# Patient Record
(demographics unavailable — no encounter records)

---

## 2025-07-17 NOTE — HISTORY OF PRESENT ILLNESS
[FreeTextEntry1] : Kianna Delarosa is a 39-year-old woman with a fam hx of HLD and CAD, she has a hx of spontaneous pneumothorax at age 22 requiring sx, depression/anxiety (follows with a therapist), HLD, GERD here for an initial CV evaluation. She saw a cardiologist, Sybil Bauer M.D., last year for palpitations post Bactrim allergy and she reports normal work-up (stress testing, Ca scoring). She had a recent stress and echo at [facility], and she was told she needed further testing.ET: able to walk numerous [place] blocks without symptoms, attends [employer] 2X/week, and RUIZ up to 3-4 flights. She remarks on eating fairly healthy but eats candy/sweets/ high carb food. She has ~4 ETOH beverages per week (wine/martini/vodka cocktails). Denies CP, SOB/RUIZ, PND/orthopnea, LILA, dizziness, palpitations, syncope or near syncope. She is here for an initial CV evaluation and would like to transfer care to [facility].   of note: Her father is a radiologist for [employer], Dr. Delarosa

## 2025-07-17 NOTE — ASSESSMENT
[FreeTextEntry1] : ------------------------------------------------------ Hyperlipidemia: , HDL 80, TG 85 (1/2025) -No statin recommended ASCVD 10-year risk is <5%, encourage lifestyle modification. - Discussed lifestyle interventions, including 30-60 mins of exercise 3-5 x per week, dietary changes including cutting out sugar drinks and other processed carbohydrates, reducing red meat intake, and increasing dietary fiber with goal > 10% weight loss, and ETOH cessation/moderation.  - Obtain labs today including LPa (fam hx of HLD and CAD). - Will order appropriate testing once other studies faxed in and reviewed.  CAD: asymptomatic, + exercise stress test ([facility]) - Will send for an exercise stress echocardiogram to rule out inducible ischemia  - Discussed the importance of seeking immediate medical attention if anginal type chest pain occurs   Anxiety: - Continue with current treatment

## 2025-07-17 NOTE — REASON FOR VISIT
[CV Risk Factors and Non-Cardiac Disease] : CV risk factors and non-cardiac disease [Hyperlipidemia] : hyperlipidemia [FreeTextEntry1] : ===================  Diagnostic tests:  ---------------------------------  [id number]: Sinus rhythm ---------------------------------  Echo: waiting for fax from outside cardiology ------------------------------------  Stress:  2025: ([facility]) Positive ECG response to exercise: Peak ECG demonstrated 1.5-2 mm horizontal downsloping STD in leads II,III,aVf, V5-V6. ----------------------------------

## 2025-07-30 NOTE — HISTORY OF PRESENT ILLNESS
[de-identified] : 40 y/o female for f/u.  Last seen one year ago for a CPE Reports some concerns: -Found a tick in her bed. -Hx of low WBC  Had viral infection - URI symptoms in June 17th, low grade fever, cough, cold. resolved Had facial botox injections on 7/14 Noticed she was much more fatigued x 1.5 weeks had period recently as well (not heavy). not pregnant was not sleeping okay (did sleep better last night) a tick was found in her bed.   Had unliteral knee pain no significant rashes no joint pains or muscle pain Stress levels are high.   no fevers, chils.

## 2025-07-30 NOTE — ASSESSMENT
[FreeTextEntry1] : 38 y/o female  1. Low WBC 3.71 with normal diff, ANC 1730 last year Recent WBC in the 2s, no diff Reports hx of the same -can see heme for further workup  2. Tick exposure, but no bite -can check lyme titers today  3. Fatigue possible combination of recent URI, recent menses, heat, stress will also check CBC, iron levels, TSH. discussed impact on stressors  4. Low WBC repeat CBC with diff today will conisder Heme referral if still low.

## 2025-07-30 NOTE — PHYSICAL EXAM
[No Acute Distress] : no acute distress [Well-Appearing] : well-appearing [Normal Sclera/Conjunctiva] : normal sclera/conjunctiva [No Respiratory Distress] : no respiratory distress  [Clear to Auscultation] : lungs were clear to auscultation bilaterally [Normal Rate] : normal rate  [Regular Rhythm] : with a regular rhythm [Normal S1, S2] : normal S1 and S2 [No Murmur] : no murmur heard [No Edema] : there was no peripheral edema [No Joint Swelling] : no joint swelling [No Rash] : no rash

## 2025-07-30 NOTE — HISTORY OF PRESENT ILLNESS
[de-identified] : 40 y/o female for f/u.  Last seen one year ago for a CPE Reports some concerns: -Found a tick in her bed. -Hx of low WBC  Had viral infection - URI symptoms in June 17th, low grade fever, cough, cold. resolved Had facial botox injections on 7/14 Noticed she was much more fatigued x 1.5 weeks had period recently as well (not heavy). not pregnant was not sleeping okay (did sleep better last night) a tick was found in her bed.   Had unliteral knee pain no significant rashes no joint pains or muscle pain Stress levels are high.   no fevers, chils.